# Patient Record
Sex: FEMALE | Race: WHITE | Employment: FULL TIME | URBAN - NONMETROPOLITAN AREA
[De-identification: names, ages, dates, MRNs, and addresses within clinical notes are randomized per-mention and may not be internally consistent; named-entity substitution may affect disease eponyms.]

---

## 2017-05-05 ENCOUNTER — TELEPHONE (OUTPATIENT)
Dept: FAMILY MEDICINE CLINIC | Age: 32
End: 2017-05-05

## 2022-08-15 ENCOUNTER — APPOINTMENT (OUTPATIENT)
Dept: GENERAL RADIOLOGY | Age: 37
End: 2022-08-15
Payer: COMMERCIAL

## 2022-08-15 ENCOUNTER — APPOINTMENT (OUTPATIENT)
Dept: CT IMAGING | Age: 37
End: 2022-08-15
Payer: COMMERCIAL

## 2022-08-15 ENCOUNTER — HOSPITAL ENCOUNTER (EMERGENCY)
Age: 37
Discharge: HOME | End: 2022-08-15
Attending: STUDENT IN AN ORGANIZED HEALTH CARE EDUCATION/TRAINING PROGRAM
Payer: COMMERCIAL

## 2022-08-15 VITALS
WEIGHT: 220 LBS | DIASTOLIC BLOOD PRESSURE: 96 MMHG | OXYGEN SATURATION: 99 % | HEART RATE: 82 BPM | BODY MASS INDEX: 34.53 KG/M2 | HEIGHT: 67 IN | RESPIRATION RATE: 18 BRPM | SYSTOLIC BLOOD PRESSURE: 133 MMHG | TEMPERATURE: 98.8 F

## 2022-08-15 DIAGNOSIS — R51.9 ACUTE NONINTRACTABLE HEADACHE, UNSPECIFIED HEADACHE TYPE: ICD-10-CM
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
## 2022-08-15 DIAGNOSIS — M54.6 ACUTE RIGHT-SIDED THORACIC BACK PAIN: Primary | ICD-10-CM

## 2022-08-15 LAB — HCG(URINE) PREGNANCY TEST: NEGATIVE

## 2022-08-15 PROCEDURE — 6360000002 HC RX W HCPCS: Performed by: STUDENT IN AN ORGANIZED HEALTH CARE EDUCATION/TRAINING PROGRAM

## 2022-08-15 PROCEDURE — 72100 X-RAY EXAM L-S SPINE 2/3 VWS: CPT

## 2022-08-15 PROCEDURE — 72072 X-RAY EXAM THORAC SPINE 3VWS: CPT

## 2022-08-15 PROCEDURE — 99284 EMERGENCY DEPT VISIT MOD MDM: CPT

## 2022-08-15 PROCEDURE — 84703 CHORIONIC GONADOTROPIN ASSAY: CPT

## 2022-08-15 PROCEDURE — 96372 THER/PROPH/DIAG INJ SC/IM: CPT

## 2022-08-15 PROCEDURE — 6370000000 HC RX 637 (ALT 250 FOR IP): Performed by: STUDENT IN AN ORGANIZED HEALTH CARE EDUCATION/TRAINING PROGRAM

## 2022-08-15 PROCEDURE — 70450 CT HEAD/BRAIN W/O DYE: CPT

## 2022-08-15 PROCEDURE — 72125 CT NECK SPINE W/O DYE: CPT

## 2022-08-15 PROCEDURE — 71046 X-RAY EXAM CHEST 2 VIEWS: CPT

## 2022-08-15 RX ORDER — LEVOTHYROXINE SODIUM 0.07 MG/1
75 TABLET ORAL DAILY
COMMUNITY

## 2022-08-15 RX ORDER — FLUOXETINE 10 MG/1
20 CAPSULE ORAL DAILY
COMMUNITY

## 2022-08-15 RX ORDER — KETOROLAC TROMETHAMINE 30 MG/ML
30 INJECTION, SOLUTION INTRAMUSCULAR; INTRAVENOUS ONCE
Status: COMPLETED | OUTPATIENT
Start: 2022-08-15 | End: 2022-08-15

## 2022-08-15 RX ORDER — METHOCARBAMOL 500 MG/1
1000 TABLET, FILM COATED ORAL 4 TIMES DAILY PRN
Qty: 20 TABLET | Refills: 0 | Status: SHIPPED | OUTPATIENT
Start: 2022-08-15 | End: 2022-08-20

## 2022-08-15 RX ORDER — ASPIRIN 81 MG/1
81 TABLET, CHEWABLE ORAL DAILY
COMMUNITY

## 2022-08-15 RX ORDER — ACETAMINOPHEN 500 MG
1000 TABLET ORAL
Status: COMPLETED | OUTPATIENT
Start: 2022-08-15 | End: 2022-08-15

## 2022-08-15 RX ADMIN — KETOROLAC TROMETHAMINE 30 MG: 30 INJECTION, SOLUTION INTRAMUSCULAR at 21:42

## 2022-08-15 RX ADMIN — ACETAMINOPHEN 1000 MG: 500 TABLET ORAL at 19:33

## 2022-08-15 ASSESSMENT — PAIN - FUNCTIONAL ASSESSMENT: PAIN_FUNCTIONAL_ASSESSMENT: 0-10

## 2022-08-15 ASSESSMENT — PAIN DESCRIPTION - LOCATION: LOCATION: BACK;NECK

## 2022-08-15 ASSESSMENT — PAIN DESCRIPTION - ORIENTATION: ORIENTATION: RIGHT;UPPER
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
## 2022-08-15 NOTE — Clinical Note
Jami Lassiter was seen and treated in our emergency department on 8/15/2022. She may return to work on 08/16/2022. If you have any questions or concerns, please don't hesitate to call.       Alden Moritz, DO

## 2022-08-16 NOTE — DISCHARGE INSTRUCTIONS
Return the nearest ED if you develop severe worsening pain, numbness and tingling, or other concerning symptoms. You can take 1000 mg of acetaminophen every 8 hours and 600 mg ibuprofen every 6 hours for pain. You can take the methocarbamol if this does not improve your pain. Follow-up with your PCP in 2 to 3 days if not improving.

## 2022-11-03 ENCOUNTER — TELEPHONE (OUTPATIENT)
Dept: CARDIOLOGY CLINIC | Age: 37
End: 2022-11-03

## 2022-11-03 NOTE — TELEPHONE ENCOUNTER
11/3/22 Tried calling pt at [phone], no answer, no vm just says \"the wireless caller you are trying to reach is not available at this time. Please hang up and try your call again\".

## 2022-11-03 NOTE — TELEPHONE ENCOUNTER
----- Message from [facility] sent at 10/27/2022 11:53 AM EDT -----  Regarding: new patient req[place] [facility]! I have a pt that contacted office being referred to us for PFO. The pt was informed and the pt wants [place] [facility] office as she lives in [facility]. The first available at that location is 1/12/23. Are we able to work the pt in for a sooner date at [employer]? Please help :). I would forward response back the  since i'll be out for a couple days.      Thank you!!    Myrtle Herrera

## 2022-11-07 NOTE — TELEPHONE ENCOUNTER
11/07 Tried calling pt at [phone], no answer, no vm. A letter has been mailed to listed address on file. Just wanted to make the MA & RN staff aware that we have made several attempts to contact.

## 2022-11-15 ENCOUNTER — TELEPHONE (OUTPATIENT)
Dept: CARDIOLOGY CLINIC | Age: 37
End: 2022-11-15

## 2022-11-15 NOTE — TELEPHONE ENCOUNTER
----- Message from [facility] sent at 10/27/2022 11:53 AM EDT -----  Regarding: new patient reqesting [facility]! I have a pt that contacted office being referred to us for PFO. The pt was informed and the pt wants to see [facility] the [facility] office as she lives [place] [facility]. The first available at that location is 1/12/23. Are we able to work the pt in for a sooner date at [employer]? Please help :). I would forward response back the  since i'll be out for a couple days.      Thank you!!    Prasanth Guillen

## 2023-01-12 ENCOUNTER — OFFICE VISIT (OUTPATIENT)
Dept: CARDIOLOGY CLINIC | Age: 38
End: 2023-01-12

## 2023-01-12 VITALS
HEART RATE: 84 BPM | WEIGHT: 248 LBS | HEIGHT: 68 IN | BODY MASS INDEX: 37.59 KG/M2 | DIASTOLIC BLOOD PRESSURE: 82 MMHG | OXYGEN SATURATION: 98 % | SYSTOLIC BLOOD PRESSURE: 116 MMHG

## 2023-01-12 DIAGNOSIS — R60.9 EDEMA, UNSPECIFIED TYPE: ICD-10-CM

## 2023-01-12 DIAGNOSIS — Q21.12 PFO (PATENT FORAMEN OVALE): ICD-10-CM

## 2023-01-12 DIAGNOSIS — R07.2 PRECORDIAL PAIN: ICD-10-CM

## 2023-01-12 DIAGNOSIS — Z76.89 ESTABLISHING CARE WITH NEW DOCTOR, ENCOUNTER FOR: Primary | ICD-10-CM

## 2023-01-12 RX ORDER — ERGOCALCIFEROL 1.25 MG/1
50000 CAPSULE ORAL WEEKLY
COMMUNITY

## 2023-01-12 RX ORDER — FERROUS SULFATE 325(65) MG
325 TABLET ORAL
COMMUNITY

## 2023-01-12 RX ORDER — LAMOTRIGINE 150 MG/1
150 TABLET ORAL DAILY
COMMUNITY

## 2023-01-12 RX ORDER — TRAZODONE HYDROCHLORIDE 50 MG/1
50 TABLET ORAL NIGHTLY
COMMUNITY

## 2023-01-12 NOTE — PROGRESS NOTES
[facility]   Cardiac Consultation    Referring Provider:  No primary care provider on file. Chief Complaint   Patient presents with    New Patient     Est cardiac care has PFO per pt which is related to TIA she had. Edema     ankles      Bibi A Brown   1985    History of Present Illness:    Massimo Oneill is a 44 y.o. female who is here today as a new consult from PCP for a history of PFO. She previously followed with [facility] cardiology, last seen 12/2020. Also mention of patient following with Dr. Zelda Wilburn at [facility]. Today she states in 2015 she was seen by Dr. Zelda Wilburn at [facility] who discussed PFO closure, some issues with insurance approval. She states she was told by neurology in 2015 that she had probable TIA. Prescribed Aspirin and plavix daily. Plavix subsequently stopped. She states when she had her TIA her left side was effected. Symptoms came in 3 times in around one hour then resolved. No recurrence since 2015. She states she has recently established with a new PCP who recommended that she see cardiology due to her strong family history of heart diease. She has been told her lipids are borderline high. She states she has chest pain at times that feels like a tightness, related to exertion. Pain does not radiate, last for a few minutes and resolves with rest. Comes on 2/3 times per week. Tightness would comes on with activity like running after her kids. Patient currently denies any weight gain, edema, palpitations, shortness of breath, dizziness, and syncope. Past Medical History:   has a past medical history of Abnormal Pap smear, Anxiety, Bipolar 1 disorder ([address] Cerebral artery occlusion with cerebral infarction ([address] Depression, Hashimoto's disease, Hypertension, Insomnia, Mental disorder, PFO (patent foramen ovale), Postpartum depression, S/P endoscopy, and Skin lesion of back. Surgical History:   has a past surgical history that includes Appendectomy;  Inner ear surgery;  section; Cholecystectomy, laparoscopic (11); laparoscopy (3/6/12); other surgical history (03/15/2013); and Tubal ligation (Bilateral). Social History:   reports that she has never smoked. She has never used smokeless tobacco. She reports current alcohol use. She reports that she does not use drugs. Family History:  family history includes Arthritis in her maternal grandmother; Heart Attack in her maternal aunt, maternal grandfather, and mother; Heart Disease in her maternal grandfather; Heart Failure in her mother; High Blood Pressure in her father and maternal grandmother; High Cholesterol in her maternal grandmother; [place] / [place] in her maternal grandmother and mother; Pacemaker in her maternal aunt. Home Medications:  Prior to Admission medications    Medication Sig Start Date End Date Taking? Authorizing Provider   traZODone (DESYREL) 50 MG tablet Take 50 mg by mouth nightly 2 tabs at bedtime   Yes Historical Provider, MD   vitamin D (ERGOCALCIFEROL) 1.25 MG (25623 UT) CAPS capsule Take 50,000 Units by mouth once a week   Yes Historical Provider, MD   lamoTRIgine (LAMICTAL) 150 MG tablet Take 150 mg by mouth daily   Yes Historical Provider, MD   ferrous sulfate (IRON 325) 325 (65 Fe) MG tablet Take 325 mg by mouth daily (with breakfast)   Yes Historical Provider, MD   levothyroxine (SYNTHROID) 75 MCG tablet Take 75 mcg by mouth in the morning. Yes Historical Provider, MD   aspirin 81 MG chewable tablet Take 81 mg by mouth in the morning. Yes Historical Provider, MD   FLUoxetine (PROZAC) 10 MG capsule Take 20 mg by mouth in the morning. Yes Historical Provider, MD   Cariprazine HCl (VRAYLAR PO) Take 3 mg by mouth daily   Yes Historical Provider, MD        Allergies:  Augmentin [amoxicillin-pot clavulanate] and Ceclor [cefaclor]     Review of Systems:   Constitutional: there has been no unanticipated weight loss.  There's been no change in energy level, sleep pattern, or activity level. Eyes: No visual changes or diplopia. No scleral icterus. ENT: No Headaches, hearing loss or vertigo. No mouth sores or sore throat. Cardiovascular: Reviewed in HPI  Respiratory: No cough or wheezing, no sputum production. No hematemesis. Gastrointestinal: No abdominal pain, appetite loss, blood in stools. No change in bowel or bladder habits. Genitourinary: No dysuria, trouble voiding, or hematuria. Musculoskeletal:  No gait disturbance, weakness or joint complaints. Integumentary: No rash or pruritis. Neurological: No headache, diplopia, change in muscle strength, numbness or tingling. No change in gait, balance, coordination, mood, affect, memory, mentation, behavior. Psychiatric: No anxiety, no depression. Endocrine: No malaise, fatigue or temperature intolerance. No excessive thirst, fluid intake, or urination. No tremor. Hematologic/Lymphatic: No abnormal bruising or bleeding, blood clots or swollen lymph nodes. Allergic/Immunologic: No nasal congestion or hives. Physical Examination:    Vitals:    01/12/23 1330   BP: 116/82   Pulse: 84   SpO2: 98%        Constitutional and General Appearance: NAD   Respiratory:  Normal excursion and expansion without use of accessory muscles  Resp Auscultation: Normal breath sounds without dullness  Cardiovascular: The apical impulses not displaced  Heart tones are crisp and normal  Cervical veins are not engorged  The carotid upstroke is normal in amplitude and contour without delay or bruit  Normal S1S2, No S3, No Murmur  Peripheral pulses are symmetrical and full  There is no clubbing, cyanosis of the extremities.   No edema  Femoral Arteries: 2+ and equal  Pedal Pulses: 2+ and equal   Abdomen:  No masses or tenderness  Liver/Spleen: No Abnormalities Noted  Neurological/Psychiatric:  Alert and oriented in all spheres  Moves all extremities well  Exhibits normal gait balance and coordination  No abnormalities of mood, affect, memory, mentation, or behavior are noted      Assessment:   Chest pain - possible angina   PFO  H/O possible TIA 2015. No recurrence since 2015 and ASA added   Hypothyroidism   Family history of premature CAD  Abnormal EKG    Plan:  Recommend a CT calcium score which is a test used as a screening tool for coronary artery disease. If the score is above 0, then would recommend adding Statin therapy. This test is considered a screening tool so it is not covered by insurance. Plain exercise stress test   Vascular screening test  Echocardiogram with bubble study  Cardiac medications reviewed including indications and pertinent side effects. Medication list updated at this visit. Continue ASA 81 mg daily. R/B/A/E discussed. Check blood pressure and heart rate at home a few times per week- keep a log with dates and times and bring to office visit   Regular exercise and following a healthy diet encouraged   Follow up with me in 1 year     Scribe's attestation: This note was scribed in the presence of Dr. Paulino Solis M.D. By Ko Spence RN    The scribes documentation has been prepared under my direction and personally reviewed by me in its entirety. I confirm that the note above accurately reflects all work, treatment, procedures, and medical decision making performed by me. Dr. Paulino Solis MD      Thank you for allowing me to participate in the care of this individual.      Jamar Rodas.  Naz Morales M.D., Isac Mathew

## 2023-01-12 NOTE — PATIENT INSTRUCTIONS
Plan:  Recommend a CT calcium score which is a test used as a screening tool for coronary artery disease. If the score is above 0, then would recommend adding Statin therapy. This test is considered a screening tool so it is not covered by insurance. Plain exercise stress test   Recommend an echocardiogram with bubble study- if this confirms PFO then would recommend referral to structural heart clinic. IF inconclusive would then recommend NELY. Cardiac medications reviewed including indications and pertinent side effects. Medication list updated at this visit. Check blood pressure and heart rate at home a few times per week- keep a log with dates and times and bring to office visit   Regular exercise and following a healthy diet encouraged   Follow up with me in 1 year     Your provider has ordered testing for further evaluation. An order/prescription has been included in your paper work. To schedule outpatient testing, contact Central Scheduling by calling [address] [place] [address] ([phone]).

## 2023-02-02 ENCOUNTER — HOSPITAL ENCOUNTER (OUTPATIENT)
Dept: VASCULAR LAB | Age: 38
Discharge: HOME | End: 2023-02-02
Payer: COMMERCIAL

## 2023-02-02 PROCEDURE — 76706 US ABDL AORTA SCREEN AAA: CPT

## 2023-02-03 ENCOUNTER — TELEPHONE (OUTPATIENT)
Dept: CARDIOLOGY CLINIC | Age: 38
End: 2023-02-03

## 2023-02-03 NOTE — TELEPHONE ENCOUNTER
----- Message from Leena Holloway MD sent at 2/3/2023  8:23 AM EST -----  Please notify patient that their vascular screening test is normal.

## 2023-02-06 ENCOUNTER — TELEPHONE (OUTPATIENT)
Dept: CARDIOLOGY CLINIC | Age: 38
End: 2023-02-06

## 2023-02-06 NOTE — TELEPHONE ENCOUNTER
[facility] asking for patient to have preoperative cardiac risk assessment for dental procedure- crowns with IV sedation- Versed and Demerol. Also asking if there is any contradictions to IV sedation. Patient will also need a copy of H&P, list of medications, sent     Fax to [phone]      Chest pain - possible angina   PFO  H/O possible TIA 2015. No recurrence since 2015 and ASA added   Hypothyroidism   Family history of premature CAD  Abnormal EKG     Plan:  Recommend a CT calcium score which is a test used as a screening tool for coronary artery disease. If the score is above 0, then would recommend adding Statin therapy. This test is considered a screening tool so it is not covered by insurance. Plain exercise stress test   Vascular screening test  Echocardiogram with bubble study  Cardiac medications reviewed including indications and pertinent side effects. Medication list updated at this visit. Continue ASA 81 mg daily. R/B/A/E discussed.    Check blood pressure and heart rate at home a few times per week- keep a log with dates and times and bring to office visit   Regular exercise and following a healthy diet encouraged   Follow up with me in 1 year

## 2023-02-07 NOTE — TELEPHONE ENCOUNTER
[facility] returned call. Message given. They will be looking for clearance letter after 23/16/2023.

## 2023-02-16 ENCOUNTER — TELEPHONE (OUTPATIENT)
Dept: CARDIOLOGY CLINIC | Age: 38
End: 2023-02-16

## 2023-02-16 ENCOUNTER — HOSPITAL ENCOUNTER (OUTPATIENT)
Dept: NON INVASIVE DIAGNOSTICS | Age: 38
Discharge: HOME | End: 2023-02-16
Payer: COMMERCIAL

## 2023-02-16 ENCOUNTER — HOSPITAL ENCOUNTER (OUTPATIENT)
Dept: CT IMAGING | Age: 38
Discharge: HOME | End: 2023-02-16
Payer: COMMERCIAL

## 2023-02-16 DIAGNOSIS — Q21.12 PFO (PATENT FORAMEN OVALE): ICD-10-CM

## 2023-02-16 DIAGNOSIS — R07.2 PRECORDIAL PAIN: ICD-10-CM

## 2023-02-16 LAB
LV EF: 55 %
LVEF MODALITY: NORMAL

## 2023-02-16 PROCEDURE — 75571 CT HRT W/O DYE W/CA TEST: CPT

## 2023-02-16 PROCEDURE — 93017 CV STRESS TEST TRACING ONLY: CPT

## 2023-02-16 PROCEDURE — 93306 TTE W/DOPPLER COMPLETE: CPT

## 2023-02-16 NOTE — TELEPHONE ENCOUNTER
----- Message from Priya Campos MD sent at 2/16/2023 12:38 PM EST -----  Please notify patient that their Ca score = 0  Very low risk CAD  Remain on ASA due to PFO  Would not rec statin

## 2023-02-16 NOTE — TELEPHONE ENCOUNTER
Created telephone encounter. Spoke with Bibi relayed message per [address] regarding ct calcium score. Pt verbalized understanding.

## 2023-02-20 ENCOUNTER — TELEPHONE (OUTPATIENT)
Dept: CARDIOLOGY CLINIC | Age: 38
End: 2023-02-20

## 2023-02-20 NOTE — TELEPHONE ENCOUNTER
----- Message from Annemarie Maciel MD sent at 2/20/2023 12:21 PM EST -----  Please notify patient that their stress test is normal  Echo looks good.  Confirms known PFO  Remain on ASA - ok to hold for procedure  OK for IV sedation w/ her planned dental work

## 2023-02-20 NOTE — LETTER
[address]  Phone: [phone]  Fax: [phone]    Claudia Harrison MD      Cardiac Clearance Letter  February 20, 2023    [address] [address]  1985      Please notify patient that their stress test is normal   Echo looks good. Confirms known PFO   Remain on ASA - ok to hold for procedure   OK for IV sedation w/ her planned dental work       If you have any questions or concerns, please don't hesitate to call.     Sincerely,        Claudia Harrison MD

## 2023-02-20 NOTE — TELEPHONE ENCOUNTER
Created telephone encounter. [facility] requesting a call back to the office. Please get a valid fax number for dentist if pt needs a letter sent to the dentist. Juvenal Lazo placed in epic if needed.

## 2023-02-21 NOTE — TELEPHONE ENCOUNTER
[facility] requesting a call back to the office. Please get a valid fax number for dentist if pt needs a letter sent to the dentist. Ursula Villar placed in epic if needed.

## 2024-01-02 ENCOUNTER — HOSPITAL ENCOUNTER (EMERGENCY)
Age: 39
Discharge: HOME | End: 2024-01-02
Attending: STUDENT IN AN ORGANIZED HEALTH CARE EDUCATION/TRAINING PROGRAM
Payer: COMMERCIAL

## 2024-01-02 ENCOUNTER — APPOINTMENT (OUTPATIENT)
Dept: GENERAL RADIOLOGY | Age: 39
End: 2024-01-02
Payer: COMMERCIAL

## 2024-01-02 VITALS
HEIGHT: 67 IN | BODY MASS INDEX: 34.53 KG/M2 | TEMPERATURE: 98.3 F | WEIGHT: 220 LBS | DIASTOLIC BLOOD PRESSURE: 73 MMHG | HEART RATE: 92 BPM | RESPIRATION RATE: 16 BRPM | OXYGEN SATURATION: 99 % | SYSTOLIC BLOOD PRESSURE: 103 MMHG

## 2024-01-02 DIAGNOSIS — R07.9 CHEST PAIN, UNSPECIFIED TYPE: Primary | ICD-10-CM

## 2024-01-02 LAB
ALBUMIN SERPL-MCNC: 3.8 G/DL (ref 3.4–5)
ALBUMIN/GLOB SERPL: 1.2 {RATIO} (ref 1.1–2.2)
ALP SERPL-CCNC: 79 U/L (ref 40–129)
ALT SERPL-CCNC: 11 U/L (ref 10–40)
ANION GAP SERPL CALCULATED.3IONS-SCNC: 10 MMOL/L (ref 3–16)
AST SERPL-CCNC: 15 U/L (ref 15–37)
BASOPHILS # BLD: 0.1 K/UL (ref 0–0.2)
BASOPHILS NFR BLD: 1.1 %
BILIRUB SERPL-MCNC: 0.3 MG/DL (ref 0–1)
BUN SERPL-MCNC: 11 MG/DL (ref 7–20)
CALCIUM SERPL-MCNC: 9.4 MG/DL (ref 8.3–10.6)
CHLORIDE SERPL-SCNC: 102 MMOL/L (ref 99–110)
CO2 SERPL-SCNC: 25 MMOL/L (ref 21–32)
CREAT SERPL-MCNC: 0.6 MG/DL (ref 0.6–1.1)
D DIMER: 0.57 UG/ML FEU (ref 0–0.6)
DEPRECATED RDW RBC AUTO: 14.8 % (ref 12.4–15.4)
EKG ATRIAL RATE: 94 BPM
EKG DIAGNOSIS: NORMAL
EKG P AXIS: 43 DEGREES
EKG P-R INTERVAL: 142 MS
EKG Q-T INTERVAL: 360 MS
EKG QRS DURATION: 82 MS
EKG QTC CALCULATION (BAZETT): 450 MS
EKG R AXIS: 25 DEGREES
EKG T AXIS: 27 DEGREES
EKG VENTRICULAR RATE: 94 BPM
EOSINOPHIL # BLD: 0.3 K/UL (ref 0–0.6)
EOSINOPHIL NFR BLD: 2.8 %
GFR SERPLBLD CREATININE-BSD FMLA CKD-EPI: >60 ML/MIN/{1.73_M2}
GLUCOSE SERPL-MCNC: 103 MG/DL (ref 70–99)
HCT VFR BLD AUTO: 40.6 % (ref 36–48)
HGB BLD-MCNC: 13.6 G/DL (ref 12–16)
LIPASE SERPL-CCNC: 42 U/L (ref 13–60)
LYMPHOCYTES # BLD: 2 K/UL (ref 1–5.1)
LYMPHOCYTES NFR BLD: 19 %
MCH RBC QN AUTO: 28.3 PG (ref 26–34)
MCHC RBC AUTO-ENTMCNC: 33.5 G/DL (ref 31–36)
MCV RBC AUTO: 84.4 FL (ref 80–100)
MONOCYTES # BLD: 0.6 K/UL (ref 0–1.3)
MONOCYTES NFR BLD: 5.7 %
NEUTROPHILS # BLD: 7.5 K/UL (ref 1.7–7.7)
NEUTROPHILS NFR BLD: 71.4 %
PLATELET # BLD AUTO: 279 K/UL (ref 135–450)
PMV BLD AUTO: 8.8 FL (ref 5–10.5)
POTASSIUM SERPL-SCNC: 4.3 MMOL/L (ref 3.5–5.1)
PROT SERPL-MCNC: 7 G/DL (ref 6.4–8.2)
RBC # BLD AUTO: 4.81 M/UL (ref 4–5.2)
SODIUM SERPL-SCNC: 137 MMOL/L (ref 136–145)
TROPONIN, HIGH SENSITIVITY: <6 NG/L (ref 0–14)
WBC # BLD AUTO: 10.5 K/UL (ref 4–11)

## 2024-01-02 PROCEDURE — 99285 EMERGENCY DEPT VISIT HI MDM: CPT

## 2024-01-02 PROCEDURE — 6370000000 HC RX 637 (ALT 250 FOR IP): Performed by: STUDENT IN AN ORGANIZED HEALTH CARE EDUCATION/TRAINING PROGRAM

## 2024-01-02 PROCEDURE — 85025 COMPLETE CBC W/AUTO DIFF WBC: CPT

## 2024-01-02 PROCEDURE — 83690 ASSAY OF LIPASE: CPT

## 2024-01-02 PROCEDURE — 85379 FIBRIN DEGRADATION QUANT: CPT

## 2024-01-02 PROCEDURE — 84484 ASSAY OF TROPONIN QUANT: CPT

## 2024-01-02 PROCEDURE — 36415 COLL VENOUS BLD VENIPUNCTURE: CPT

## 2024-01-02 PROCEDURE — 71045 X-RAY EXAM CHEST 1 VIEW: CPT

## 2024-01-02 PROCEDURE — 93005 ELECTROCARDIOGRAM TRACING: CPT | Performed by: STUDENT IN AN ORGANIZED HEALTH CARE EDUCATION/TRAINING PROGRAM

## 2024-01-02 PROCEDURE — 80053 COMPREHEN METABOLIC PANEL: CPT

## 2024-01-02 RX ORDER — ASPIRIN 81 MG/1
324 TABLET, CHEWABLE ORAL ONCE
Status: COMPLETED | OUTPATIENT
Start: 2024-01-02 | End: 2024-01-02

## 2024-01-02 RX ADMIN — ASPIRIN 324 MG: 81 TABLET, CHEWABLE ORAL at 08:16

## 2024-01-02 ASSESSMENT — HEART SCORE: ECG: 1

## 2024-01-02 ASSESSMENT — LIFESTYLE VARIABLES
HOW OFTEN DO YOU HAVE A DRINK CONTAINING ALCOHOL: NEVER
HOW MANY STANDARD DRINKS CONTAINING ALCOHOL DO YOU HAVE ON A TYPICAL DAY: PATIENT DOES NOT DRINK

## 2024-01-02 ASSESSMENT — PAIN - FUNCTIONAL ASSESSMENT: PAIN_FUNCTIONAL_ASSESSMENT: 0-10

## 2024-01-02 ASSESSMENT — PAIN SCALES - GENERAL: PAINLEVEL_OUTOF10: 2

## 2024-01-02 NOTE — ED PROVIDER NOTES
DIAGNOSTIC RESULTS     EKG: All EKG's are interpreted by the Emergency Department Physician who either signs or Co-signs this chart in the absence of a cardiologist.    Sinus rhythm ventricular rate 94, NH interval 142, QRS 82, QTc 450, normal axis, no clinically significant ST segment ovation depression, no Wellens no de Pastr[place] no Brugada syndrome no WPW no hypertrophic cardiomyopathy no arrhythmogenic right ventricular dysplasia, nonspecific T wave changes in lead III    RADIOLOGY:   Non-plain film images such as CT, Ultrasound and MRI are read by the radiologist. Plainradiographic images are visualized and preliminarily interpreted by the  ED Provider with the belowfindings:    No cardiomegaly, no focal consolidation no pleural effusions, no pneumothorax    Interpretation per the Radiologist below, if available at the time of this note:    XR CHEST PORTABLE   Final Result   No acute cardiopulmonary findings           No results found.   No results found.     LABS: (none if blank)  Labs Reviewed   COMPREHENSIVE METABOLIC PANEL W/ REFLEX TO MG FOR LOW K - Abnormal; Notable for the following components:       Result Value    Glucose 103 (*)     All other components within normal limits   CBC WITH AUTO DIFFERENTIAL   TROPONIN   LIPASE   D-DIMER, QUANTITATIVE       (Any cultures that may have been sent were not resulted at the time of this patient visit)    MEDICAL DECISION MAKING / ED COURSE:     External Documentation Reviewed: Previous patient encounter documents & history available on EMR was reviewed:   Patient was seen on 8/15/22 for acute right sided thoracic back pain.      Differential Diagnosis includes (but not limited to):  Musculoskeletal, pleurisy, pulm embolism, ACS, arrhythmia, pneumothorax, pneumonia    Decision Rules/Clinical Scores utilized:      Heart Score    Heart Score for chest pain patients  History: Moderately Suspicious  ECG: Non-Specifc repolarization disturbance/LBTB/PM  Patient Age: < 45

## 2024-01-02 NOTE — DISCHARGE INSTRUCTIONS
You may take Tylenol Motrin for pain control.  Follow-up with your primary care physician the next 2 days.  Return if develop any new or worsening symptoms.

## 2024-02-06 NOTE — PROGRESS NOTES
[facility]   Cardiac Consultation    Referring Provider:  Lachelle Bunn, LAURA - JEFFRY     Chief Complaint   Patient presents with    Follow-up    Chest Pain     One episode in January, \"ached\" for two weeks following       Bibi Brown   1985    History of Present Illness:    Bibi Brown is a 38 y.o. female who is here today for ER follow up for chest pain. She was initially seen as a new patient consult at the request of PCP for  PFO. She previously followed with [facility] cardiology, last seen 12/2020.    Last OV- she states in 2015 she was seen by Dr. Mundo Demarco at  who discussed PFO closure, some issues with insurance approval. She states she was told by neurology in 2015 that she had probable TIA. Prescribed Aspirin and plavix daily. Plavix subsequently stopped. She states when she had her TIA her left side was effected. Symptoms came in 3 times in around one hour then resolved. No recurrence since 2015. She states she has recently established with a new PCP who recommended that she see cardiology due to her strong family history of heart diease. She has been told her lipids are borderline high. She states she has chest pain at times that feels like a tightness, related to exertion. Pain does not radiate, last for a few minutes and resolves with rest. Comes on 2/3 times per week. Tightness would comes on with activity like running after her kids. Patient currently denies any weight gain, edema, palpitations, shortness of breath, dizziness, and syncope.   CT calcium score 2/2023 was 0. Echocardiogram 22023 showed an EF of 55%, bubble study consistent with PFO. Stress test was normal. Vascular screening test is normal.    She presented to the ER 1/2/2024 with chest pains. Troponin <6, EKG NSR with non specific ST changes. Chest X-Ray negative. Today she states prior to going to the ER on 1/2/2024 she started to have chest pain after having sex followed by emesis. She states she went to her

## 2024-02-08 ENCOUNTER — OFFICE VISIT (OUTPATIENT)
Age: 39
End: 2024-02-08
Payer: COMMERCIAL

## 2024-02-08 VITALS
BODY MASS INDEX: 35.16 KG/M2 | HEART RATE: 93 BPM | SYSTOLIC BLOOD PRESSURE: 96 MMHG | HEIGHT: 67 IN | OXYGEN SATURATION: 99 % | WEIGHT: 224 LBS | DIASTOLIC BLOOD PRESSURE: 62 MMHG

## 2024-02-08 DIAGNOSIS — Q21.12 PFO (PATENT FORAMEN OVALE): Primary | ICD-10-CM

## 2024-02-08 PROCEDURE — 99214 OFFICE O/P EST MOD 30 MIN: CPT | Performed by: INTERNAL MEDICINE

## 2024-05-16 ENCOUNTER — HOSPITAL ENCOUNTER (OUTPATIENT)
Dept: ULTRASOUND IMAGING | Age: 39
Discharge: HOME | End: 2024-05-16
Payer: COMMERCIAL

## 2024-05-16 DIAGNOSIS — E04.1 THYROID NODULE: ICD-10-CM

## 2024-05-16 PROCEDURE — 76536 US EXAM OF HEAD AND NECK: CPT

## 2024-05-20 ENCOUNTER — HOSPITAL ENCOUNTER (OUTPATIENT)
Dept: MAMMOGRAPHY | Age: 39
Discharge: HOME | End: 2024-05-20
Payer: COMMERCIAL

## 2024-05-20 DIAGNOSIS — Z12.39 SCREENING BREAST EXAMINATION: ICD-10-CM

## 2024-05-20 PROCEDURE — 77063 BREAST TOMOSYNTHESIS BI: CPT

## 2024-06-03 NOTE — PROGRESS NOTES
status: Never    Smokeless tobacco: Never   Vaping Use    Vaping Use: Never used   Substance and Sexual Activity    Alcohol use: Yes     Comment: rare    Drug use: No    Sexual activity: Yes     Partners: Male   Other Topics Concern    Not on file   Social History Narrative    Not on file     Social Determinants of Health     Financial Resource Strain: Not on file   Food Insecurity: Not on file   Transportation Needs: Not on file   Physical Activity: Not on file   Stress: Not on file   Social Connections: Not on file   Intimate Partner Violence: Not on file   Housing Stability: Not on file     DRUG/FOOD ALLERGIES: Augmentin [amoxicillin-pot clavulanate] and Ceclor [cefaclor]  CURRENT MEDICATIONS  Prior to Admission medications    Medication Sig Start Date End Date Taking? Authorizing Provider   traZODone (DESYREL) 50 MG tablet Take 1 tablet by mouth nightly 2 tabs at bedtime   Yes Margaret Knight MD   vitamin D (ERGOCALCIFEROL) 1.25 MG (29740 UT) CAPS capsule Take 1 capsule by mouth once a week   Yes Margaret Knight MD   lamoTRIgine (LAMICTAL) 150 MG tablet Take 1 tablet by mouth daily   Yes Margaret Knight MD   ferrous sulfate (IRON 325) 325 (65 Fe) MG tablet Take 1 tablet by mouth daily (with breakfast)   Yes Margaret Knight MD   levothyroxine (SYNTHROID) 75 MCG tablet Take 1 tablet by mouth Daily   Yes Margaret Knight MD   aspirin 81 MG chewable tablet Take 1 tablet by mouth daily   Yes Margaret Knight MD   FLUoxetine (PROZAC) 10 MG capsule Take 2 capsules by mouth daily   Yes Margaret Knight MD   Cariprazine HCl (VRAYLAR PO) Take 3 mg by mouth daily   Yes Margaret Knight MD     REVIEW OF SYSTEMS  The following systems were reviewed and revealed the following in addition to any already discussed in the HPI:  Review of Systems   Constitutional:  Negative for fatigue and fever.   HENT:  Negative for congestion, ear pain, postnasal drip, rhinorrhea and sneezing.

## 2024-06-05 ENCOUNTER — OFFICE VISIT (OUTPATIENT)
Dept: ENT CLINIC | Age: 39
End: 2024-06-05
Payer: COMMERCIAL

## 2024-06-05 VITALS
DIASTOLIC BLOOD PRESSURE: 86 MMHG | WEIGHT: 209 LBS | HEIGHT: 67 IN | SYSTOLIC BLOOD PRESSURE: 130 MMHG | BODY MASS INDEX: 32.8 KG/M2 | HEART RATE: 93 BPM

## 2024-06-05 DIAGNOSIS — E04.1 THYROID NODULE: Primary | ICD-10-CM

## 2024-06-05 PROCEDURE — 99202 OFFICE O/P NEW SF 15 MIN: CPT | Performed by: STUDENT IN AN ORGANIZED HEALTH CARE EDUCATION/TRAINING PROGRAM

## 2024-06-05 ASSESSMENT — ENCOUNTER SYMPTOMS
EYE PAIN: 0
RHINORRHEA: 0
SHORTNESS OF BREATH: 0
COUGH: 0
NAUSEA: 0
VOMITING: 0

## 2025-02-28 ENCOUNTER — TELEPHONE (OUTPATIENT)
Dept: PULMONOLOGY | Age: 40
End: 2025-02-28

## 2025-03-03 ENCOUNTER — TELEPHONE (OUTPATIENT)
Dept: PULMONOLOGY | Age: 40
End: 2025-03-03

## 2025-03-03 NOTE — TELEPHONE ENCOUNTER
Received referral for patient from Greta on 1/26 for LOREN. After calling patient, patient was interested in using Inspire so I faxed referral to [facility].

## 2025-04-06 ASSESSMENT — SLEEP AND FATIGUE QUESTIONNAIRES
HOW LIKELY ARE YOU TO NOD OFF OR FALL ASLEEP WHILE SITTING INACTIVE IN A PUBLIC PLACE: SLIGHT CHANCE OF DOZING
HOW LIKELY ARE YOU TO NOD OFF OR FALL ASLEEP IN A CAR, WHILE STOPPED FOR A FEW MINUTES IN TRAFFIC: MODERATE CHANCE OF DOZING
HOW LIKELY ARE YOU TO NOD OFF OR FALL ASLEEP WHILE LYING DOWN TO REST IN THE AFTERNOON WHEN CIRCUMSTANCES PERMIT: HIGH CHANCE OF DOZING
HOW LIKELY ARE YOU TO NOD OFF OR FALL ASLEEP WHEN YOU ARE A PASSENGER IN A CAR FOR AN HOUR WITHOUT A BREAK: HIGH CHANCE OF DOZING
HOW LIKELY ARE YOU TO NOD OFF OR FALL ASLEEP WHILE SITTING AND READING: HIGH CHANCE OF DOZING
HOW LIKELY ARE YOU TO NOD OFF OR FALL ASLEEP WHILE SITTING INACTIVE IN A PUBLIC PLACE: SLIGHT CHANCE OF DOZING
HOW LIKELY ARE YOU TO NOD OFF OR FALL ASLEEP WHILE SITTING QUIETLY AFTER LUNCH WITHOUT ALCOHOL: MODERATE CHANCE OF DOZING
HOW LIKELY ARE YOU TO NOD OFF OR FALL ASLEEP WHILE WATCHING TV: HIGH CHANCE OF DOZING
HOW LIKELY ARE YOU TO NOD OFF OR FALL ASLEEP WHILE SITTING AND TALKING TO SOMEONE: SLIGHT CHANCE OF DOZING
ESS TOTAL SCORE: 18
HOW LIKELY ARE YOU TO NOD OFF OR FALL ASLEEP IN A CAR, WHILE STOPPED FOR A FEW MINUTES IN TRAFFIC: MODERATE CHANCE OF DOZING
HOW LIKELY ARE YOU TO NOD OFF OR FALL ASLEEP WHEN YOU ARE A PASSENGER IN A CAR FOR AN HOUR WITHOUT A BREAK: HIGH CHANCE OF DOZING
HOW LIKELY ARE YOU TO NOD OFF OR FALL ASLEEP WHILE SITTING AND TALKING TO SOMEONE: SLIGHT CHANCE OF DOZING
HOW LIKELY ARE YOU TO NOD OFF OR FALL ASLEEP WHILE LYING DOWN TO REST IN THE AFTERNOON WHEN CIRCUMSTANCES PERMIT: HIGH CHANCE OF DOZING
HOW LIKELY ARE YOU TO NOD OFF OR FALL ASLEEP WHILE SITTING QUIETLY AFTER LUNCH WITHOUT ALCOHOL: MODERATE CHANCE OF DOZING
HOW LIKELY ARE YOU TO NOD OFF OR FALL ASLEEP WHILE WATCHING TV: HIGH CHANCE OF DOZING
HOW LIKELY ARE YOU TO NOD OFF OR FALL ASLEEP WHILE SITTING AND READING: HIGH CHANCE OF DOZING

## 2025-04-08 ENCOUNTER — OFFICE VISIT (OUTPATIENT)
Dept: PULMONOLOGY | Age: 40
End: 2025-04-08
Payer: COMMERCIAL

## 2025-04-08 VITALS
WEIGHT: 243 LBS | BODY MASS INDEX: 36.83 KG/M2 | SYSTOLIC BLOOD PRESSURE: 125 MMHG | OXYGEN SATURATION: 99 % | RESPIRATION RATE: 16 BRPM | DIASTOLIC BLOOD PRESSURE: 86 MMHG | HEIGHT: 68 IN | TEMPERATURE: 97.5 F | HEART RATE: 89 BPM

## 2025-04-08 DIAGNOSIS — E66.9 OBESITY (BMI 30-39.9): ICD-10-CM

## 2025-04-08 DIAGNOSIS — G47.33 OSA (OBSTRUCTIVE SLEEP APNEA): Primary | ICD-10-CM

## 2025-04-08 PROCEDURE — 99205 OFFICE O/P NEW HI 60 MIN: CPT | Performed by: INTERNAL MEDICINE

## 2025-04-08 NOTE — PROGRESS NOTES
MA Communication:  The following orders are received by verbal communication from Dick Magana MD        Orders include:        Oral appliance Alterna faxed  No follow up needed

## 2025-04-08 NOTE — PATIENT INSTRUCTIONS
obstructive sleep apnea.     To avoid side effects when starting Tirzepatide  Eat smaller meals  Stop eating when full  Avoid fatty foods  Drink plenty of water, 8 eight ounce glasses of water daily at a minimum         Weight, Diet & Exercise Diary     Date Weight >70 grams   protein Aerobic   exercise Resistance   training                                                                                                                                                                                                                                   Please remember to bring a list of medications and any CPAP or BiPAP machines to your next appointment with the office.     Out of respect for other patients and providers, we ask that you arrive no later than your scheduled appointment time.  If you arrive later than your appointment time, you may be asked to reschedule your appointment.     Late cancellations result in other patients being unable to utilize the appointment slot to see their physician.  Please avoid cancelling your appointment less than 1 week prior to the appointment date.  Patients with multiple missed appointments within 2 years may be dismissed from the practice.     In the next few weeks, you will be receiving a survey from [facility] regarding your visit today.  Your input is valuable to us & surveys are regularly reviewed by [facility] leadership.  It is very important to us that our patients receive excellent care.  If your experience was excellent, please let us know!  If your experience was not a good one, please tell us so we can make needed corrections. We hope you are comfortable recommending us to others for their healthcare needs.     We thank you for choosing [facility]!    Detail Level: Detailed Quality 130: Documentation Of Current Medications In The Medical Record: Current Medications Documented Quality 226: Preventive Care And Screening: Tobacco Use: Screening And Cessation Intervention: Patient screened for tobacco use and is an ex/non-smoker

## 2025-04-08 NOTE — PROGRESS NOTES
[facility] Sleep Medicine       CC: LOREN  Referring Provider:  Oni     PRESENTING 4/8/25 : Patient has known Obstructive Sleep Apnea with CPAP/BiPAP intolerance & ineffectiveness despite efforts to optimize therapy.     History of Present Illness  She underwent two home sleep studies, the first conducted four years ago at [facility], which revealed significant obstructive sleep apnea. Despite attempts to manage the condition with continuous positive airway pressure (CPAP) therapy, she was unable to tolerate the device. Due to persistent fatigue, she revisited CPAP therapy, trying several masks. Initially, she used a nasal mask but switched to a full face mask due to oronasal breathing. She is able to fall asleep with CPAP but awakens after a few hours and removes the device, experiencing anxiety and discomfort. She is considering the Inspire device as a treatment option. She acknowledges the need for weight reduction and reports weight gain over the past 3-4 years. She retains her natural dentition. She does not engage in regular physical exercise. She previously had a . She tried semaglutide, which caused gastrointestinal disturbances, and tirzepatide, which was better tolerated.    MEDICATIONS  Past: semaglutide, tirzepatide           4/6/2025     9:38 AM   Sleep Medicine   Sitting and reading 3   Watching TV 3   Sitting, inactive in a public place (e.g. a theatre or a meeting) 1   As a passenger in a car for an hour without a break 3   Lying down to rest in the afternoon when circumstances permit 3   Sitting and talking to someone 1   Sitting quietly after a lunch without alcohol 2   In a car, while stopped for a few minutes in traffic 2   [place] Sleepiness Score 18    Neck (Inches) 14.75       Patient-reported       Past Medical History:   Diagnosis Date    Abnormal Pap smear     2006-normal since    Anxiety     Bipolar 1 disorder (HCC)     Cerebral artery occlusion with cerebral

## 2025-04-14 DIAGNOSIS — E66.9 OBESITY (BMI 30-39.9): ICD-10-CM

## 2025-04-14 DIAGNOSIS — G47.33 OSA (OBSTRUCTIVE SLEEP APNEA): ICD-10-CM

## 2025-05-22 ENCOUNTER — TRANSCRIBE ORDERS (OUTPATIENT)
Dept: ADMINISTRATIVE | Age: 40
End: 2025-05-22

## 2025-05-22 DIAGNOSIS — E04.1 THYROID NODULE: Primary | ICD-10-CM

## 2025-06-08 NOTE — PATIENT INSTRUCTIONS
Plan:  ~Continue Aspirin 81 mg daily   ~Call if you start to have more chest pains   Cardiac medications reviewed including indications and pertinent side effects. Medication list updated at this visit.   Check blood pressure and heart rate at home a few times per week- keep a log with dates and times and bring to office visit   Regular exercise and following a healthy diet encouraged   Follow up with me as needed and Lachelle Bunn, LAURA - CNP yearly     
Name band;

## 2025-06-12 ENCOUNTER — HOSPITAL ENCOUNTER (OUTPATIENT)
Dept: ULTRASOUND IMAGING | Age: 40
Discharge: HOME | End: 2025-06-12
Payer: COMMERCIAL

## 2025-06-12 DIAGNOSIS — E04.1 THYROID NODULE: ICD-10-CM

## 2025-06-12 PROCEDURE — 76536 US EXAM OF HEAD AND NECK: CPT
